# Patient Record
Sex: MALE | Race: BLACK OR AFRICAN AMERICAN | NOT HISPANIC OR LATINO | Employment: FULL TIME | URBAN - METROPOLITAN AREA
[De-identification: names, ages, dates, MRNs, and addresses within clinical notes are randomized per-mention and may not be internally consistent; named-entity substitution may affect disease eponyms.]

---

## 2022-01-10 PROBLEM — K21.9 GERD (GASTROESOPHAGEAL REFLUX DISEASE): Status: ACTIVE | Noted: 2022-01-10

## 2022-01-10 PROBLEM — N40.0 BPH (BENIGN PROSTATIC HYPERPLASIA): Status: ACTIVE | Noted: 2022-01-10

## 2022-01-10 PROBLEM — I10 HTN (HYPERTENSION): Status: ACTIVE | Noted: 2022-01-10

## 2022-03-02 PROBLEM — C61 PROSTATE CANCER: Status: ACTIVE | Noted: 2022-03-02

## 2023-06-12 PROBLEM — R73.03 PREDIABETES: Status: ACTIVE | Noted: 2023-06-12

## 2024-04-08 ENCOUNTER — PATIENT OUTREACH (OUTPATIENT)
Dept: ADMINISTRATIVE | Facility: HOSPITAL | Age: 64
End: 2024-04-08

## 2024-04-08 NOTE — PROGRESS NOTES
Non-compliant report chart audits for [facility], COLON CANCER SCREENING. Chart review completed for  test overdue (mammograms, Colonoscopies, pap smears, DM labs, and/or EYE EXAMs)      Care Everywhere and media, updates requested and reviewed.    No documentation of colon cancer screening